# Patient Record
Sex: FEMALE | Race: WHITE | NOT HISPANIC OR LATINO | URBAN - NONMETROPOLITAN AREA
[De-identification: names, ages, dates, MRNs, and addresses within clinical notes are randomized per-mention and may not be internally consistent; named-entity substitution may affect disease eponyms.]

---

## 2020-09-01 ENCOUNTER — OFFICE VISIT (OUTPATIENT)
Dept: URBAN - NONMETROPOLITAN AREA CLINIC 2 | Facility: CLINIC | Age: 30
End: 2020-09-01
Payer: COMMERCIAL

## 2020-09-01 ENCOUNTER — LAB OUTSIDE AN ENCOUNTER (OUTPATIENT)
Dept: URBAN - NONMETROPOLITAN AREA CLINIC 2 | Facility: CLINIC | Age: 30
End: 2020-09-01

## 2020-09-01 DIAGNOSIS — R19.5 POSITIVE COLORECTAL CANCER SCREENING USING COLOGUARD TEST: ICD-10-CM

## 2020-09-01 DIAGNOSIS — R19.4 BOWEL HABIT CHANGES: ICD-10-CM

## 2020-09-01 DIAGNOSIS — K62.5 RECTAL BLEEDING: ICD-10-CM

## 2020-09-01 PROCEDURE — 99243 OFF/OP CNSLTJ NEW/EST LOW 30: CPT | Performed by: INTERNAL MEDICINE

## 2020-09-01 PROCEDURE — 99203 OFFICE O/P NEW LOW 30 MIN: CPT | Performed by: INTERNAL MEDICINE

## 2020-09-01 RX ORDER — SODIUM PICOSULFATE, MAGNESIUM OXIDE, AND ANHYDROUS CITRIC ACID 10; 3.5; 12 MG/160ML; G/160ML; G/160ML
320ML LIQUID ORAL AS DIRECTED
Qty: 320 MILLILITER | Refills: 0 | OUTPATIENT
Start: 2020-09-01 | End: 2020-09-02

## 2020-09-01 NOTE — HPI-TODAY'S VISIT:
The patient is a 30-year-old female who presents today for rectal bleeding.  She has had issues with rectal bleeding for several years.  She had a colonoscopy in [place] in 2013.  At that time, she was told she had proctitis and IBS.  She was given Bentyl, but she did not feel like this helped significantly.  She was never given any medications for the proctitis.  She does have bleeding with every bowel movement.  She alternates between diarrhea and constipation.  She does not feel that probiotics have helped with her symptoms.  She denies any straining or rectal plate pain, but she does have urgency.  She also has hesitancy with her bowel movements.  She has a large amount of bright red blood with every bowel movement.  It is mixed in with the stool.  She denies any melena.  She had recent blood work done by her primary care physician, and her hemoglobin was stable.  She did have a Hemoccult and a Cologuard that were positive.  She is willing to have a repeat colonoscopy today.

## 2020-09-10 ENCOUNTER — OFFICE VISIT (OUTPATIENT)
Dept: URBAN - NONMETROPOLITAN AREA SURGERY CENTER 1 | Facility: SURGERY CENTER | Age: 30
End: 2020-09-10
Payer: COMMERCIAL

## 2020-09-10 ENCOUNTER — TELEPHONE ENCOUNTER (OUTPATIENT)
Dept: URBAN - METROPOLITAN AREA CLINIC 92 | Facility: CLINIC | Age: 30
End: 2020-09-10

## 2020-09-10 DIAGNOSIS — K62.5 ANAL BLEEDING: ICD-10-CM

## 2020-09-10 DIAGNOSIS — K62.89 ANAL BURNING: ICD-10-CM

## 2020-09-10 PROCEDURE — G9937 DIG OR SURV COLSCO: HCPCS | Performed by: INTERNAL MEDICINE

## 2020-09-10 PROCEDURE — G8907 PT DOC NO EVENTS ON DISCHARG: HCPCS | Performed by: INTERNAL MEDICINE

## 2020-09-10 PROCEDURE — 45380 COLONOSCOPY AND BIOPSY: CPT | Performed by: INTERNAL MEDICINE

## 2020-09-10 RX ORDER — MESALAMINE 1.2 G/1
4 TABLETS TABLET, DELAYED RELEASE ORAL ONCE A DAY
Qty: 360 TABLET | Refills: 3 | OUTPATIENT
Start: 2020-09-10 | End: 2021-09-05

## 2020-09-10 RX ORDER — MESALAMINE 1000 MG/1
1 SUPPOSITORY AT BEDTIME SUPPOSITORY RECTAL ONCE A DAY
Qty: 30 SUPPOSITORIES | Refills: 1 | OUTPATIENT
Start: 2020-09-10 | End: 2020-11-08

## 2020-09-11 ENCOUNTER — TELEPHONE ENCOUNTER (OUTPATIENT)
Dept: URBAN - METROPOLITAN AREA CLINIC 92 | Facility: CLINIC | Age: 30
End: 2020-09-11

## 2020-10-01 ENCOUNTER — OFFICE VISIT (OUTPATIENT)
Dept: URBAN - METROPOLITAN AREA TELEHEALTH 2 | Facility: TELEHEALTH | Age: 30
End: 2020-10-01

## 2020-10-01 RX ORDER — MESALAMINE 1000 MG/1
1 SUPPOSITORY AT BEDTIME SUPPOSITORY RECTAL ONCE A DAY
Qty: 30 SUPPOSITORIES | Refills: 1 | Status: ACTIVE | COMMUNITY
Start: 2020-09-10 | End: 2020-11-08

## 2020-10-01 RX ORDER — MESALAMINE 1.2 G/1
4 TABLETS TABLET, DELAYED RELEASE ORAL ONCE A DAY
Qty: 360 TABLET | Refills: 3 | Status: ACTIVE | COMMUNITY
Start: 2020-09-10 | End: 2021-09-05

## 2021-01-27 ENCOUNTER — DASHBOARD ENCOUNTERS (OUTPATIENT)
Age: 31
End: 2021-01-27

## 2021-01-27 ENCOUNTER — OFFICE VISIT (OUTPATIENT)
Dept: URBAN - METROPOLITAN AREA TELEHEALTH 2 | Facility: TELEHEALTH | Age: 31
End: 2021-01-27
Payer: COMMERCIAL

## 2021-01-27 DIAGNOSIS — K51.20 ULCERATIVE PROCTITIS: ICD-10-CM

## 2021-01-27 DIAGNOSIS — Z12.11 COLON CANCER SCREENING: ICD-10-CM

## 2021-01-27 PROBLEM — 275978004 COLON CANCER SCREENING: Status: ACTIVE | Noted: 2021-01-27

## 2021-01-27 PROBLEM — 52231000 ULCERATIVE PROCTITIS: Status: ACTIVE | Noted: 2021-01-27

## 2021-01-27 PROCEDURE — G9902 PT SCRN TBCO AND ID AS USER: HCPCS | Performed by: INTERNAL MEDICINE

## 2021-01-27 PROCEDURE — 99213 OFFICE O/P EST LOW 20 MIN: CPT | Performed by: INTERNAL MEDICINE

## 2021-01-27 PROCEDURE — 4004F PT TOBACCO SCREEN RCVD TLK: CPT | Performed by: INTERNAL MEDICINE

## 2021-01-27 PROCEDURE — G8420 CALC BMI NORM PARAMETERS: HCPCS | Performed by: INTERNAL MEDICINE

## 2021-01-27 PROCEDURE — G9906 PT RECV TBCO CESS INTERV: HCPCS | Performed by: INTERNAL MEDICINE

## 2021-01-27 PROCEDURE — G8427 DOCREV CUR MEDS BY ELIG CLIN: HCPCS | Performed by: INTERNAL MEDICINE

## 2021-01-27 RX ORDER — MESALAMINE 1.2 G/1
4 TABLETS TABLET, DELAYED RELEASE ORAL ONCE A DAY
Qty: 360 TABLET | Refills: 3
Start: 2020-09-10 | End: 2021-09-05

## 2021-01-27 RX ORDER — MESALAMINE 1.2 G/1
4 TABLETS TABLET, DELAYED RELEASE ORAL ONCE A DAY
Qty: 360 TABLET | Refills: 3 | Status: ACTIVE | COMMUNITY
Start: 2020-09-10 | End: 2021-09-05

## 2021-01-27 RX ORDER — BUDESONIDE 28 MG/1
1 APPLICATION BID X 2 WEEKS THEN QHS X 4 WEEKS AEROSOL, FOAM RECTAL TWICE A DAY
Qty: 4 CARTRIDGE | Refills: 2 | OUTPATIENT
Start: 2021-01-27 | End: 2021-04-27

## 2021-01-27 NOTE — HPI-TODAY'S VISIT:
The patient is a 30-year-old female who presents today for rectal bleeding.  She has had issues with rectal bleeding for several years.  She had a colonoscopy in [place] in 2013.  At that time, she was told she had proctitis and IBS.  She was given Bentyl, but she did not feel like this helped significantly.  She was never given any medications for the proctitis.  She does have bleeding with every bowel movement.  She alternates between diarrhea and constipation.  She does not feel that probiotics have helped with her symptoms.  She denies any straining or rectal plate pain, but she does have urgency.  She also has hesitancy with her bowel movements.  She has a large amount of bright red blood with every bowel movement.  It is mixed in with the stool.  She denies any melena.  She had recent blood work done by her primary care physician, and her hemoglobin was stable.  She did have a Hemoccult and a Cologuard that were positive.  She is willing to have a repeat colonoscopy today.   1/27/2021 Caitlin presents for follow up UP. 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 She continue to have BRBPR. She did take the meslamine for a month but stopped due to running out. She has not been on the canasa consistently. Today she is still struggling with her symptoms. MB

## 2021-04-21 ENCOUNTER — OFFICE VISIT (OUTPATIENT)
Dept: URBAN - METROPOLITAN AREA TELEHEALTH 2 | Facility: TELEHEALTH | Age: 31
End: 2021-04-21

## 2022-07-09 ENCOUNTER — TELEPHONE ENCOUNTER (OUTPATIENT)
Dept: URBAN - METROPOLITAN AREA CLINIC 121 | Facility: CLINIC | Age: 32
End: 2022-07-09

## 2022-07-10 ENCOUNTER — TELEPHONE ENCOUNTER (OUTPATIENT)
Dept: URBAN - METROPOLITAN AREA CLINIC 121 | Facility: CLINIC | Age: 32
End: 2022-07-10

## 2022-07-10 RX ORDER — LEVONORGESTREL / ETHINYL ESTRADIOL AND ETHINYL ESTRADIOL 150-30(84)
KIT ORAL
Refills: 0 | Status: ACTIVE | COMMUNITY
Start: 2011-12-02